# Patient Record
Sex: FEMALE | Race: WHITE | NOT HISPANIC OR LATINO
[De-identification: names, ages, dates, MRNs, and addresses within clinical notes are randomized per-mention and may not be internally consistent; named-entity substitution may affect disease eponyms.]

---

## 2024-04-09 ENCOUNTER — NON-APPOINTMENT (OUTPATIENT)
Age: 34
End: 2024-04-09

## 2024-04-10 ENCOUNTER — APPOINTMENT (OUTPATIENT)
Dept: ENDOCRINOLOGY | Facility: CLINIC | Age: 34
End: 2024-04-10
Payer: COMMERCIAL

## 2024-04-10 VITALS
HEIGHT: 67 IN | WEIGHT: 219 LBS | HEART RATE: 85 BPM | SYSTOLIC BLOOD PRESSURE: 139 MMHG | DIASTOLIC BLOOD PRESSURE: 90 MMHG | BODY MASS INDEX: 34.37 KG/M2

## 2024-04-10 DIAGNOSIS — Z83.49 FAMILY HISTORY OF OTHER ENDOCRINE, NUTRITIONAL AND METABOLIC DISEASES: ICD-10-CM

## 2024-04-10 DIAGNOSIS — Z87.09 PERSONAL HISTORY OF OTHER DISEASES OF THE RESPIRATORY SYSTEM: ICD-10-CM

## 2024-04-10 DIAGNOSIS — Z82.49 FAMILY HISTORY OF ISCHEMIC HEART DISEASE AND OTHER DISEASES OF THE CIRCULATORY SYSTEM: ICD-10-CM

## 2024-04-10 DIAGNOSIS — Z80.0 FAMILY HISTORY OF MALIGNANT NEOPLASM OF DIGESTIVE ORGANS: ICD-10-CM

## 2024-04-10 DIAGNOSIS — Z86.018 PERSONAL HISTORY OF OTHER BENIGN NEOPLASM: ICD-10-CM

## 2024-04-10 PROBLEM — Z00.00 ENCOUNTER FOR PREVENTIVE HEALTH EXAMINATION: Status: ACTIVE | Noted: 2024-04-10

## 2024-04-10 PROCEDURE — 99204 OFFICE O/P NEW MOD 45 MIN: CPT | Mod: GC

## 2024-04-10 PROCEDURE — 36415 COLL VENOUS BLD VENIPUNCTURE: CPT

## 2024-04-10 RX ORDER — CHLORHEXIDINE GLUCONATE 4 %
325 (65 FE) LIQUID (ML) TOPICAL
Refills: 0 | Status: ACTIVE | COMMUNITY

## 2024-04-10 NOTE — ADDENDUM
[FreeTextEntry1] : Attending endocrinologist. The patient was seen with Dr. Nguyen. She is a 33 y.o who was found to have TSH of 4.97, with fT4 of 1.9. She has no symptoms suggestive of hypothyroidism. Her thyroid exam today is normal. Will obtain TFTs and anti-thyroid ABs. Will obtain thyroid u/sound. Will follow once the above results are available. Agree with Dr. Nguyen's assessment and plan.  This visit's time included review of previous records,  lab. data, discussing findings, differential diagnosis, further testing and evaluation, therapeutic options, renewing medications, completing the record.

## 2024-04-10 NOTE — ASSESSMENT
[FreeTextEntry1] : # Subclinical hypothyroidism  - labs showed TSH 4.970, Free T4 1.9 - given strong family history, likely have autoimmune process - will check antibodies - explained to patient that with her age if she's + for antibodies, will consider treatment - will repeat TFTs  - will obtain thyroid ultrasound  d/w Dr. Still

## 2024-04-10 NOTE — HISTORY OF PRESENT ILLNESS
[FreeTextEntry1] : 32 yo F with asthma referred here by her gynecologist for abnormal TFTs  Pt has been seeing gynecologist for her recurrent fibroids.  Recently had MRI which revealed markely enlarged fibroid uterus with largest fibroid  From the pre-operative lab testing, 02/2024 labs showed TSH 4.970, Free T41.9, total t4 7.4 LH 6.2  FSH 3.8 prolactin 18.5 total testosterone 25 Pt states her periods are regular, heavy and painful. No kids.  Works from home Mom and grandmother have hypothyroidism weighs 221 lbs, gaining weight taking iron frequently constipatied.     denies neck radiation, neck surgery, exposure to amiodarone and lithium

## 2024-04-12 ENCOUNTER — TRANSCRIPTION ENCOUNTER (OUTPATIENT)
Age: 34
End: 2024-04-12

## 2024-04-12 LAB
T4 FREE SERPL-MCNC: 1.1 NG/DL
THYROPEROXIDASE AB SERPL IA-ACNC: 443 IU/ML
TSH SERPL-ACNC: 4.74 UIU/ML

## 2024-04-15 LAB
THYROGLOB AB SERPL-ACNC: <1 IU/ML
THYROGLOB SERPL-MCNC: 19.4 NG/ML
THYROGLOB SERPL-MCNC: NORMAL NG/ML

## 2024-05-11 ENCOUNTER — APPOINTMENT (OUTPATIENT)
Dept: ULTRASOUND IMAGING | Facility: HOSPITAL | Age: 34
End: 2024-05-11
Payer: COMMERCIAL

## 2024-05-11 ENCOUNTER — OUTPATIENT (OUTPATIENT)
Dept: OUTPATIENT SERVICES | Facility: HOSPITAL | Age: 34
LOS: 1 days | End: 2024-05-11

## 2024-05-11 PROCEDURE — 76536 US EXAM OF HEAD AND NECK: CPT | Mod: 26

## 2024-05-11 PROCEDURE — 76536 US EXAM OF HEAD AND NECK: CPT

## 2024-05-12 ENCOUNTER — NON-APPOINTMENT (OUTPATIENT)
Age: 34
End: 2024-05-12

## 2024-05-15 ENCOUNTER — APPOINTMENT (OUTPATIENT)
Dept: ENDOCRINOLOGY | Facility: CLINIC | Age: 34
End: 2024-05-15
Payer: COMMERCIAL

## 2024-05-15 VITALS
WEIGHT: 222 LBS | BODY MASS INDEX: 34.77 KG/M2 | DIASTOLIC BLOOD PRESSURE: 86 MMHG | SYSTOLIC BLOOD PRESSURE: 126 MMHG | HEART RATE: 83 BPM

## 2024-05-15 DIAGNOSIS — E03.8 OTHER SPECIFIED HYPOTHYROIDISM: ICD-10-CM

## 2024-05-15 DIAGNOSIS — E06.3 AUTOIMMUNE THYROIDITIS: ICD-10-CM

## 2024-05-15 PROCEDURE — 99214 OFFICE O/P EST MOD 30 MIN: CPT | Mod: GC

## 2024-05-15 RX ORDER — LEVOTHYROXINE SODIUM 0.05 MG/1
50 TABLET ORAL DAILY
Qty: 30 | Refills: 1 | Status: ACTIVE | COMMUNITY
Start: 2024-04-12 | End: 1900-01-01

## 2024-05-15 NOTE — END OF VISIT
[] : Fellow [FreeTextEntry3] : History of subclinical hypothyroidism.  Now been on LT4 50 mcg for 2 weeks, will repeat TFTs in 4 weeks  Clinically Euthyroid   Case discussed with Dr. Nguyen

## 2024-05-15 NOTE — PHYSICAL EXAM
[Alert] : alert [Well Nourished] : well nourished [No Acute Distress] : no acute distress [Well Developed] : well developed [Normal Sclera/Conjunctiva] : normal sclera/conjunctiva [No Proptosis] : no proptosis [Thyroid Not Enlarged] : the thyroid was not enlarged [No Thyroid Nodules] : no palpable thyroid nodules [No Respiratory Distress] : no respiratory distress [No Accessory Muscle Use] : no accessory muscle use [Clear to Auscultation] : lungs were clear to auscultation bilaterally [Normal S1, S2] : normal S1 and S2 [Normal Rate] : heart rate was normal [Regular Rhythm] : with a regular rhythm [Normal Bowel Sounds] : normal bowel sounds [Not Tender] : non-tender [Not Distended] : not distended [Soft] : abdomen soft [Normal Anterior Cervical Nodes] : no anterior cervical lymphadenopathy [No Spinal Tenderness] : no spinal tenderness [Spine Straight] : spine straight [Normal Gait] : normal gait [Normal Reflexes] : deep tendon reflexes were 2+ and symmetric [No Tremors] : no tremors [Oriented x3] : oriented to person, place, and time [EOMI] : extra ocular movement intact

## 2024-05-15 NOTE — HISTORY OF PRESENT ILLNESS
Call received from Nakia Reyes, daughter, stating she has concerns regarding her dad. Nakia Reyes stated he is due for tx next week and stated he is having issues with increased fatigue, weakness, weight loss, dehydration and stated he fell a few days ago. Nakia Reyes stated that she gave her dad a few glasses of juice and he ate a small snack and perked right up. Nakia Reyes stated she wants patient to be seen by Dr Radha Chang. Writer reviewed schedule and Dr Meme Serna is rounding next week and not in the office until 1/22. Writer called [employer] and spoke with Stephanie Olivarez. Stephanie Olivarez stated she has an opening at 11:30am tomorrow at [facility] with Dr Meme Serna. Nakia Reyes informed of appt time for 11:30am tomorrow at [facility] and she stated that would work. Appt made per Stephanie Olivarez. Writer instructed Nakia Reyes to have patient go to ER STAT with any severe confusion, worsening dizziness, if he falls again, with any SOB or any other severe s/s. Nakia Reyes verbalized understanding.  Jacinta Gibson [FreeTextEntry1] : 34 yo F with asthma here for f/u visit  05/15/2024 - f/u visit TPO ab + 400 started on levothyroxine 25 mcg and increased to levothyroxine 50 mcg a week after, TSH 4.74, Free T4 1.1 surgery for fibroids in october takes iron in the afternoon diffusely heterogenous thyroid gland - 2/2 thyroidtiis on TUS - recent one no plan for pregnancy per patinet she endorses her energy seems to be better on the thyroid medication continues to have painful period  04/10/2024 - initial visit Pt has been seeing gynecologist for her recurrent fibroids.  Recently had MRI which revealed markely enlarged fibroid uterus with largest fibroid From the pre-operative lab testing, 02/2024 labs showed TSH 4.970, Free T41.9, total t4 7.4 LH 6.2  FSH 3.8 prolactin 18.5 total testosterone 25 Pt states her periods are regular, heavy and painful. No kids.  Works from home Mom and grandmother have hypothyroidism weighs 221 lbs, gaining weight taking iron frequently constipatied.     denies neck radiation, neck surgery, exposure to amiodarone and lithium

## 2024-05-15 NOTE — ASSESSMENT
[FreeTextEntry1] : # Hashimoto's thyroditis # Subclinical hypothyroidism - c/w levothyroxine 50 mcg daily - counselled pt to let the clinic know if she is changing her plan to become pregnant, counselled on the severed effects of hypothyroidism on an infant - repeat TFTs in 4 weeks  - d/w Dr. Baird

## 2024-10-14 VITALS
HEART RATE: 77 BPM | DIASTOLIC BLOOD PRESSURE: 75 MMHG | OXYGEN SATURATION: 97 % | TEMPERATURE: 98 F | SYSTOLIC BLOOD PRESSURE: 116 MMHG | HEIGHT: 67 IN | RESPIRATION RATE: 16 BRPM | WEIGHT: 218.26 LBS

## 2024-10-14 NOTE — ASU PATIENT PROFILE, ADULT - FALL HARM RISK - UNIVERSAL INTERVENTIONS
Bed in lowest position, wheels locked, appropriate side rails in place/Call bell, personal items and telephone in reach/Instruct patient to call for assistance before getting out of bed or chair/Non-slip footwear when patient is out of bed/[place] to call system/Physically safe environment - no spills, clutter or unnecessary equipment/Purposeful Proactive Rounding/Room/bathroom lighting operational, light cord in reach

## 2024-10-14 NOTE — PRE-OP CHECKLIST - SPO2 (%)
Quality 47: Advance Care Plan: Advance Care Planning discussed and documented; advance care plan or surrogate decision maker documented in the medical record. Quality 130: Documentation Of Current Medications In The Medical Record: Current Medications Documented Name And Contact Information For Health Care Proxy: Daniela Maggy [phone] Additional Notes: They don’t do well with flu or pneumonia vaccine Quality 111:Pneumonia Vaccination Status For Older Adults: Pneumococcal Vaccination not Administered or Previously Received, Reason not Otherwise Specified Detail Level: Detailed Quality 431: Preventive Care And Screening: Unhealthy Alcohol Use - Screening: Patient screened for unhealthy alcohol use using a single question and scores less than 2 times per year Quality 226: Preventive Care And Screening: Tobacco Use: Screening And Cessation Intervention: Patient screened for tobacco use and is an ex/non-smoker Quality 110: Preventive Care And Screening: Influenza Immunization: Influenza Immunization not Administered for Documented Reasons. 97

## 2024-10-14 NOTE — ASU PATIENT PROFILE, ADULT - NSICDXPASTMEDICALHX_GEN_ALL_CORE_FT
PAST MEDICAL HISTORY:  Leiomyoma      PAST MEDICAL HISTORY:  Asthma     Leiomyoma      PAST MEDICAL HISTORY:  Asthma     Hashimoto's disease     Leiomyoma

## 2024-10-15 ENCOUNTER — TRANSCRIPTION ENCOUNTER (OUTPATIENT)
Age: 34
End: 2024-10-15

## 2024-10-15 ENCOUNTER — OUTPATIENT (OUTPATIENT)
Dept: INPATIENT UNIT | Facility: HOSPITAL | Age: 34
LOS: 1 days | Discharge: HOME | End: 2024-10-15
Payer: COMMERCIAL

## 2024-10-15 DIAGNOSIS — Z98.890 OTHER SPECIFIED POSTPROCEDURAL STATES: Chronic | ICD-10-CM

## 2024-10-15 LAB
BLD GP AB SCN SERPL QL: NEGATIVE — SIGNIFICANT CHANGE UP
HCT VFR BLD CALC: 34.5 % — SIGNIFICANT CHANGE UP (ref 34.5–45)
HGB BLD-MCNC: 11 G/DL — LOW (ref 11.5–15.5)
MCHC RBC-ENTMCNC: 26.4 PG — LOW (ref 27–34)
MCHC RBC-ENTMCNC: 31.9 GM/DL — LOW (ref 32–36)
MCV RBC AUTO: 82.9 FL — SIGNIFICANT CHANGE UP (ref 80–100)
NRBC # BLD: 0 /100 WBCS — SIGNIFICANT CHANGE UP (ref 0–0)
PLATELET # BLD AUTO: 346 K/UL — SIGNIFICANT CHANGE UP (ref 150–400)
RBC # BLD: 4.16 M/UL — SIGNIFICANT CHANGE UP (ref 3.8–5.2)
RBC # FLD: 14.7 % — HIGH (ref 10.3–14.5)
RH IG SCN BLD-IMP: POSITIVE — SIGNIFICANT CHANGE UP
T4 FREE SERPL-MCNC: 1.1 NG/DL
TSH SERPL-ACNC: 4.38 UIU/ML
WBC # BLD: 12.06 K/UL — HIGH (ref 3.8–10.5)
WBC # FLD AUTO: 12.06 K/UL — HIGH (ref 3.8–10.5)

## 2024-10-15 PROCEDURE — 88305 TISSUE EXAM BY PATHOLOGIST: CPT | Mod: 26

## 2024-10-15 DEVICE — INTERCEED 3 X 4": Type: IMPLANTABLE DEVICE | Status: FUNCTIONAL

## 2024-10-15 DEVICE — SURGICEL POWDER 3 GRAMS: Type: IMPLANTABLE DEVICE | Status: FUNCTIONAL

## 2024-10-15 RX ORDER — ACETAMINOPHEN 325 MG
1000 TABLET ORAL ONCE
Refills: 0 | Status: COMPLETED | OUTPATIENT
Start: 2024-10-15 | End: 2024-10-15

## 2024-10-15 RX ORDER — ACETAMINOPHEN 325 MG
2 TABLET ORAL
Qty: 0 | Refills: 0 | DISCHARGE
Start: 2024-10-15

## 2024-10-15 RX ORDER — SODIUM CHLORIDE IRRIG SOLUTION 0.9 %
1000 SOLUTION, IRRIGATION IRRIGATION
Refills: 0 | Status: DISCONTINUED | OUTPATIENT
Start: 2024-10-15 | End: 2024-10-16

## 2024-10-15 RX ORDER — OXYCODONE HYDROCHLORIDE 30 MG/1
10 TABLET, FILM COATED, EXTENDED RELEASE ORAL EVERY 4 HOURS
Refills: 0 | Status: DISCONTINUED | OUTPATIENT
Start: 2024-10-15 | End: 2024-10-16

## 2024-10-15 RX ORDER — METOCLOPRAMIDE HCL 5 MG
10 TABLET ORAL EVERY 6 HOURS
Refills: 0 | Status: DISCONTINUED | OUTPATIENT
Start: 2024-10-15 | End: 2024-10-16

## 2024-10-15 RX ORDER — KETOROLAC TROMETHAMINE 10 MG/1
30 TABLET, FILM COATED ORAL EVERY 8 HOURS
Refills: 0 | Status: DISCONTINUED | OUTPATIENT
Start: 2024-10-15 | End: 2024-10-16

## 2024-10-15 RX ORDER — OXYCODONE HYDROCHLORIDE 30 MG/1
5 TABLET, FILM COATED, EXTENDED RELEASE ORAL EVERY 4 HOURS
Refills: 0 | Status: DISCONTINUED | OUTPATIENT
Start: 2024-10-15 | End: 2024-10-16

## 2024-10-15 RX ORDER — ALBUTEROL 90 MCG
2 AEROSOL (GRAM) INHALATION
Refills: 0 | DISCHARGE

## 2024-10-15 RX ORDER — 5-HYDROXYTRYPTOPHAN (5-HTP) 100 MG
5 TABLET,DISINTEGRATING ORAL AT BEDTIME
Refills: 0 | Status: DISCONTINUED | OUTPATIENT
Start: 2024-10-15 | End: 2024-10-16

## 2024-10-15 RX ORDER — ALBUTEROL 90 MCG
2 AEROSOL (GRAM) INHALATION EVERY 6 HOURS
Refills: 0 | Status: DISCONTINUED | OUTPATIENT
Start: 2024-10-15 | End: 2024-10-16

## 2024-10-15 RX ORDER — ONDANSETRON HCL/PF 4 MG/2 ML
8 VIAL (ML) INJECTION EVERY 6 HOURS
Refills: 0 | Status: DISCONTINUED | OUTPATIENT
Start: 2024-10-15 | End: 2024-10-16

## 2024-10-15 RX ORDER — ACETAMINOPHEN 325 MG
1000 TABLET ORAL EVERY 6 HOURS
Refills: 0 | Status: DISCONTINUED | OUTPATIENT
Start: 2024-10-15 | End: 2024-10-16

## 2024-10-15 RX ORDER — PANTOPRAZOLE SODIUM 40 MG/1
20 TABLET, DELAYED RELEASE ORAL DAILY
Refills: 0 | Status: DISCONTINUED | OUTPATIENT
Start: 2024-10-15 | End: 2024-10-16

## 2024-10-15 RX ORDER — HYDROMORPHONE HYDROCHLORIDE 1 MG/ML
0.5 INJECTION, SOLUTION INTRAMUSCULAR; INTRAVENOUS; SUBCUTANEOUS
Refills: 0 | Status: DISCONTINUED | OUTPATIENT
Start: 2024-10-15 | End: 2024-10-15

## 2024-10-15 RX ORDER — CELECOXIB 200 MG/1
400 CAPSULE ORAL ONCE
Refills: 0 | Status: COMPLETED | OUTPATIENT
Start: 2024-10-15 | End: 2024-10-15

## 2024-10-15 RX ADMIN — Medication 8 MILLIGRAM(S): at 18:23

## 2024-10-15 RX ADMIN — CELECOXIB 400 MILLIGRAM(S): 200 CAPSULE ORAL at 13:10

## 2024-10-15 RX ADMIN — Medication 5000 UNIT(S): at 13:10

## 2024-10-15 RX ADMIN — Medication 1000 MILLIGRAM(S): at 13:10

## 2024-10-15 RX ADMIN — KETOROLAC TROMETHAMINE 30 MILLIGRAM(S): 10 TABLET, FILM COATED ORAL at 21:49

## 2024-10-15 RX ADMIN — HYDROMORPHONE HYDROCHLORIDE 0.5 MILLIGRAM(S): 1 INJECTION, SOLUTION INTRAMUSCULAR; INTRAVENOUS; SUBCUTANEOUS at 18:50

## 2024-10-15 RX ADMIN — PANTOPRAZOLE SODIUM 20 MILLIGRAM(S): 40 TABLET, DELAYED RELEASE ORAL at 19:42

## 2024-10-15 RX ADMIN — Medication 80 MILLIGRAM(S): at 19:24

## 2024-10-15 RX ADMIN — Medication 125 MILLILITER(S): at 18:40

## 2024-10-15 RX ADMIN — Medication 1000 MILLIGRAM(S): at 19:24

## 2024-10-15 RX ADMIN — HYDROMORPHONE HYDROCHLORIDE 0.5 MILLIGRAM(S): 1 INJECTION, SOLUTION INTRAMUSCULAR; INTRAVENOUS; SUBCUTANEOUS at 18:23

## 2024-10-15 RX ADMIN — Medication 5 MILLIGRAM(S): at 21:49

## 2024-10-15 NOTE — PRE-ANESTHESIA EVALUATION ADULT - NSANTHOSAYNRD_GEN_A_CORE
No. ALEIDA screening performed.  STOP BANG Legend: 0-2 = LOW Risk; 3-4 = INTERMEDIATE Risk; 5-8 = HIGH Risk

## 2024-10-15 NOTE — DISCHARGE NOTE PROVIDER - NSDCMRMEDTOKEN_GEN_ALL_CORE_FT
acetaminophen 500 mg oral tablet: 2 tab(s) orally every 6 hours  Albuterol (Eqv-ProAir HFA) 90 mcg/inh inhalation aerosol: 2 puff(s) inhaled prn  ferrous sulfate 325 mg po daily:   levothyroxine 50 mcg (0.05 mg) oral tablet: 1 tab(s) orally once a day

## 2024-10-15 NOTE — BRIEF OPERATIVE NOTE - NSICDXBRIEFPROCEDURE_GEN_ALL_CORE_FT
PROCEDURES:  Abdominal myomectomy 15-Oct-2024 17:35:08  Isac Bustos  Lysis of intestinal adhesions 15-Oct-2024 17:35:32 and peritoneal Isac Bustos

## 2024-10-15 NOTE — DISCHARGE NOTE PROVIDER - HOSPITAL COURSE
35 yo w/ PMH anemia, asthma, and hashimoto thyroiditis presented for scheduled surgical management of symptomatic fibroids, s/p abdominal myomectomy, lysis of adhesions 10/15. Patient met postoperative milestones appropriately and at discharge is hemodynamically stable.

## 2024-10-15 NOTE — H&P ADULT - HISTORY OF PRESENT ILLNESS
POD0     33yo G0 LMP 9/29 presenting for abdominal myomectomy for heavy menses    Hb 11.9, Cr 0.65    OB: G0   Gyn: denies hx abnormal pap smears, stds. +fibroids  PMH: asthma, hashimoto, anemia  PSH: abdominal myomectomy 2018  Meds: levothyroxine 50mcg, iron, albuterol prn   ALL: PCN (rash)

## 2024-10-15 NOTE — DISCHARGE NOTE PROVIDER - NSDCFUSCHEDAPPT_GEN_ALL_CORE_FT
Michelle Nguyen Physician [facility]  END[address]CR[address]  Scheduled Appointment: 11/01/2024    Michelle Nguyen  [facility]  ENDOCRIN [address]  Scheduled Appointment: 01/08/2025

## 2024-10-15 NOTE — DISCHARGE NOTE PROVIDER - NSDCFUADDINST_GEN_ALL_CORE_FT
- Nothing in vagina: no intercourse, tampons, or douching until cleared by your doctor.   - Avoid swimming, tub baths, and heavy lifting until cleared by your doctor.   - Showering is acceptable.   - Continue oral pain medications as directed and as needed for pain.    - Follow up in office in 1-2 weeks for your postoperative visit.    - Call the office sooner if you develop any fever, heavy bleeding, or severe pain.  - Go to the closest emergency room for any of these symptoms if you are not able to contact your doctor.

## 2024-10-15 NOTE — DISCHARGE NOTE PROVIDER - CARE PROVIDER_API CALL
Vania Breaux  Obstetrics and Gynecology  [address], [address]  [place] [address]  Phone: [phone]  Fax: [phone]  Follow Up Time:

## 2024-10-15 NOTE — DISCHARGE NOTE PROVIDER - CARE PROVIDERS DIRECT ADDRESSES
[email or web address]

## 2024-10-15 NOTE — DISCHARGE NOTE PROVIDER - NSDCCPTREATMENT_GEN_ALL_CORE_FT
PRINCIPAL PROCEDURE  Procedure: Abdominal myomectomy  Findings and Treatment:       SECONDARY PROCEDURE  Procedure: Lysis of intestinal adhesions  Findings and Treatment: and peritoneal

## 2024-10-15 NOTE — BRIEF OPERATIVE NOTE - OPERATION/FINDINGS
EUA revealed normal external female genitalia. Uterus noted to be about 8cm above umbilicus, deviated to right side.   Pfannenstiel skin incision through previous incision.   Difficulty externalizing uterus initially 2/2 to large fibroids and significant bowel adhesions to posterior uterus. Initial myomectomy completed for lower uterine segment, then able to externalize uterus. Dominant 18cm pedunculated fibroid with 5cm stalk noted and removed with assistance of tourniquet. Vasopressin injected to all fibroids. Closed with stratfix. Mattress sutures with 1 PDS on XLH used for additional hemostasis. Intercede and surgicel powder applied to incisional sites.   Fascia closed with 0 vicryl. Subcutaneous closed with monocryl. Skin closed with insorb.   1u PRBC and TXA administered intraop.   , excellent hemostasis.

## 2024-10-15 NOTE — CHART NOTE - NSCHARTNOTEFT_GEN_A_CORE
Patient seen at bedside. She reports mild numbness in the right hand that has been present since surgery. She denies headache, weakness, or difficulty speaking. Overall, pain is controlled with meds but she reports moderate abdominal pain. She is tolerating small sips of water but has not tried food yet. Not yet passing flatus. Alves catheter in place.    T(C): 36.8 (10-15-24 @ 20:01), Max: 37.2 (10-15-24 @ 17:44)  HR: 106 (10-15-24 @ 20:01) (77 - 106)  BP: 127/83 (10-15-24 @ 20:01) (116/75 - 141/76)  RR: 18 (10-15-24 @ 20:01) (12 - 18)  SpO2: 94% (10-15-24 @ 20:01) (94% - 98%)  General: no apparent distress  Pulmonary: no increased work of breathing  Abdomen: island dressing clean dry intact; nondistended; normoactive bowel sounds; soft; appropriately tender  Neuro:  strength equal bilaterally; gross motion intact; speech appropriate  Lower extremities: no calf tenderness or swelling bilaterally        34y s/p abdominal myomectomy, stable and meeting postoperative milestones.    Neuro: tylenol/toradol atc, oxy prn   Pulm: RA, asthma - albuterol PRN   Endo: hypothyroidism - synthroid 50mcg QD   CV: VSS,   GI: LFD, zofran/reglan prn, protonix qd, simethicone atc  : alves  GYN: s/p abdominal myomectomy  DVT ppx: SCDs

## 2024-10-16 ENCOUNTER — TRANSCRIPTION ENCOUNTER (OUTPATIENT)
Age: 34
End: 2024-10-16

## 2024-10-16 VITALS
TEMPERATURE: 98 F | HEART RATE: 96 BPM | RESPIRATION RATE: 18 BRPM | DIASTOLIC BLOOD PRESSURE: 76 MMHG | OXYGEN SATURATION: 96 % | SYSTOLIC BLOOD PRESSURE: 122 MMHG

## 2024-10-16 LAB
BASOPHILS # BLD AUTO: 0.01 K/UL — SIGNIFICANT CHANGE UP (ref 0–0.2)
BASOPHILS NFR BLD AUTO: 0.1 % — SIGNIFICANT CHANGE UP (ref 0–2)
EOSINOPHIL # BLD AUTO: 0 K/UL — SIGNIFICANT CHANGE UP (ref 0–0.5)
EOSINOPHIL NFR BLD AUTO: 0 % — SIGNIFICANT CHANGE UP (ref 0–6)
HCT VFR BLD CALC: 32.8 % — LOW (ref 34.5–45)
HGB BLD-MCNC: 10.1 G/DL — LOW (ref 11.5–15.5)
IMM GRANULOCYTES NFR BLD AUTO: 0.1 % — SIGNIFICANT CHANGE UP (ref 0–0.9)
LYMPHOCYTES # BLD AUTO: 1.11 K/UL — SIGNIFICANT CHANGE UP (ref 1–3.3)
LYMPHOCYTES # BLD AUTO: 14.6 % — SIGNIFICANT CHANGE UP (ref 13–44)
MCHC RBC-ENTMCNC: 26.1 PG — LOW (ref 27–34)
MCHC RBC-ENTMCNC: 30.8 GM/DL — LOW (ref 32–36)
MCV RBC AUTO: 84.8 FL — SIGNIFICANT CHANGE UP (ref 80–100)
MONOCYTES # BLD AUTO: 0.99 K/UL — HIGH (ref 0–0.9)
MONOCYTES NFR BLD AUTO: 13 % — SIGNIFICANT CHANGE UP (ref 2–14)
NEUTROPHILS # BLD AUTO: 5.47 K/UL — SIGNIFICANT CHANGE UP (ref 1.8–7.4)
NEUTROPHILS NFR BLD AUTO: 72.2 % — SIGNIFICANT CHANGE UP (ref 43–77)
NRBC # BLD: 0 /100 WBCS — SIGNIFICANT CHANGE UP (ref 0–0)
PLATELET # BLD AUTO: 335 K/UL — SIGNIFICANT CHANGE UP (ref 150–400)
RBC # BLD: 3.87 M/UL — SIGNIFICANT CHANGE UP (ref 3.8–5.2)
RBC # FLD: 15 % — HIGH (ref 10.3–14.5)
WBC # BLD: 7.59 K/UL — SIGNIFICANT CHANGE UP (ref 3.8–10.5)
WBC # FLD AUTO: 7.59 K/UL — SIGNIFICANT CHANGE UP (ref 3.8–10.5)

## 2024-10-16 PROCEDURE — 36415 COLL VENOUS BLD VENIPUNCTURE: CPT

## 2024-10-16 PROCEDURE — P9016: CPT

## 2024-10-16 PROCEDURE — 88305 TISSUE EXAM BY PATHOLOGIST: CPT

## 2024-10-16 PROCEDURE — 85025 COMPLETE CBC W/AUTO DIFF WBC: CPT

## 2024-10-16 PROCEDURE — C1889: CPT

## 2024-10-16 PROCEDURE — 36430 TRANSFUSION BLD/BLD COMPNT: CPT

## 2024-10-16 PROCEDURE — 58546 LAPARO-MYOMECTOMY COMPLEX: CPT

## 2024-10-16 PROCEDURE — 86923 COMPATIBILITY TEST ELECTRIC: CPT

## 2024-10-16 PROCEDURE — 85027 COMPLETE CBC AUTOMATED: CPT

## 2024-10-16 PROCEDURE — 86900 BLOOD TYPING SEROLOGIC ABO: CPT

## 2024-10-16 PROCEDURE — C1765: CPT

## 2024-10-16 PROCEDURE — 86850 RBC ANTIBODY SCREEN: CPT

## 2024-10-16 PROCEDURE — 86901 BLOOD TYPING SEROLOGIC RH(D): CPT

## 2024-10-16 RX ADMIN — Medication 1000 MILLIGRAM(S): at 12:45

## 2024-10-16 RX ADMIN — PANTOPRAZOLE SODIUM 20 MILLIGRAM(S): 40 TABLET, DELAYED RELEASE ORAL at 12:39

## 2024-10-16 RX ADMIN — KETOROLAC TROMETHAMINE 30 MILLIGRAM(S): 10 TABLET, FILM COATED ORAL at 15:37

## 2024-10-16 RX ADMIN — Medication 1000 MILLIGRAM(S): at 12:39

## 2024-10-16 RX ADMIN — Medication 80 MILLIGRAM(S): at 05:23

## 2024-10-16 RX ADMIN — Medication 50 MICROGRAM(S): at 05:23

## 2024-10-16 RX ADMIN — KETOROLAC TROMETHAMINE 30 MILLIGRAM(S): 10 TABLET, FILM COATED ORAL at 14:55

## 2024-10-16 RX ADMIN — Medication 80 MILLIGRAM(S): at 12:39

## 2024-10-16 RX ADMIN — KETOROLAC TROMETHAMINE 30 MILLIGRAM(S): 10 TABLET, FILM COATED ORAL at 05:23

## 2024-10-16 RX ADMIN — Medication 1000 MILLIGRAM(S): at 05:23

## 2024-10-16 NOTE — PROGRESS NOTE ADULT - ASSESSMENT
35yo G0 LMP 9/29 s/p abdominal myomectomy and YOANDY - 19 fibroids removed, largest 18cm, cavity not entered. Bowel adhesions to posterior uterus.     Neuro: tylenol/toradol ATC, oxy PRN. melatonin QHS.  Pulm: RA, Asthma - albuterol PRN   Endo: Hypothyroidism - synthroid QD   CV: VSS  GI: LFD, heplock, zofran/reglan PRN, protonix QD, simethicone ATC  : s/p alves  GYN: s/p abdominal myomectomy  Heme: Hgb 11.9>11.0  DVT: SCDs    F/u AM labs and TOV  Encourage ISS and ambulation   Possible dc this afternoon

## 2024-10-16 NOTE — DISCHARGE NOTE NURSING/CASE MANAGEMENT/SOCIAL WORK - FINANCIAL ASSISTANCE
[facility] provides services at a reduced cost to those who are determined to be eligible through [facility]’s financial assistance program. Information regarding [facility]’s financial assistance program can be found by going to [email or web address] or by calling [phone].

## 2024-10-16 NOTE — PROGRESS NOTE ADULT - SUBJECTIVE AND OBJECTIVE BOX
Pt seen and examined at bedside. Pt states mild abdominal pain. Pt not yet ambulating, tolerating clear diet. Not yet passing flatus, urinating adequately in alves.   Pt denies fever, chills, chest pain, SOB, nausea, vomiting, lightheadedness, dizziness.      T(F): 98.6 (10-16-24 @ 05:25), Max: 98.9 (10-15-24 @ 17:44)  HR: 86 (10-16-24 @ 05:25) (77 - 106)  BP: 121/79 (10-16-24 @ 05:25) (116/75 - 141/76)  RR: 18 (10-16-24 @ 05:25) (12 - 18)  SpO2: 95% (10-16-24 @ 05:25) (94% - 98%)  I&O's Summary    15 Oct 2024 07:01  -  16 Oct 2024 07:00  --------------------------------------------------------  IN: 1750 mL / OUT: 2350 mL / NET: -600 mL        MEDICATIONS  (STANDING):  acetaminophen     Tablet .. 1000 milliGRAM(s) Oral every 6 hours  ketorolac   Injectable 30 milliGRAM(s) IV Push every 8 hours  levothyroxine 50 MICROGram(s) Oral every 24 hours  melatonin 5 milliGRAM(s) Oral at bedtime  pantoprazole  Injectable 20 milliGRAM(s) IV Push daily  simethicone 80 milliGRAM(s) Chew every 6 hours    MEDICATIONS  (PRN):  albuterol    90 MICROgram(s) HFA Inhaler 2 Puff(s) Inhalation every 6 hours PRN Bronchospasm  metoclopramide Injectable 10 milliGRAM(s) IV Push every 6 hours PRN Nausea and/or Vomiting  ondansetron Injectable 8 milliGRAM(s) IV Push every 6 hours PRN Nausea and/or Vomiting  oxyCODONE    IR 5 milliGRAM(s) Oral every 4 hours PRN Moderate Pain (4 - 6)  oxyCODONE    IR 10 milliGRAM(s) Oral every 4 hours PRN Severe Pain (7 - 10)      Physical Exam:  Constitutional: NAD  Pulmonary: no increased work of breathing  Cardiovascular: Regular rate and rhythm   Abdomen: incision site clean, dry, intact. Island dressing in place. Soft, mildly tender, mildly distended, no guarding, no rebound, + bowel sounds  Extremities: no lower extremity edema or calf tenderness. SCDs in place     LABS:                        11.0   12.06 )-----------( 346      ( 15 Oct 2024 20:58 )             34.5

## 2024-10-16 NOTE — DISCHARGE NOTE NURSING/CASE MANAGEMENT/SOCIAL WORK - PATIENT PORTAL LINK FT
You can access the FollowMyHealth Patient Portal offered by [facility] by registering at the following website: [email or web address]. By joining [employer]’s FollowMyHealth portal, you will also be able to view your health information using other applications (apps) compatible with our system.

## 2024-10-28 LAB — SURGICAL PATHOLOGY STUDY: SIGNIFICANT CHANGE UP

## 2024-10-29 DIAGNOSIS — J45.909 UNSPECIFIED ASTHMA, UNCOMPLICATED: ICD-10-CM

## 2024-10-29 DIAGNOSIS — D25.9 LEIOMYOMA OF UTERUS, UNSPECIFIED: ICD-10-CM

## 2024-10-29 DIAGNOSIS — N92.0 EXCESSIVE AND FREQUENT MENSTRUATION WITH REGULAR CYCLE: ICD-10-CM

## 2024-10-29 DIAGNOSIS — Z88.0 ALLERGY STATUS TO PENICILLIN: ICD-10-CM

## 2024-10-29 DIAGNOSIS — E06.3 AUTOIMMUNE THYROIDITIS: ICD-10-CM

## 2024-10-29 DIAGNOSIS — Z79.890 HORMONE REPLACEMENT THERAPY: ICD-10-CM

## 2024-11-01 ENCOUNTER — APPOINTMENT (OUTPATIENT)
Dept: ENDOCRINOLOGY | Facility: CLINIC | Age: 34
End: 2024-11-01

## 2025-03-28 ENCOUNTER — APPOINTMENT (OUTPATIENT)
Dept: ENDOCRINOLOGY | Facility: CLINIC | Age: 35
End: 2025-03-28
Payer: COMMERCIAL

## 2025-03-28 VITALS
SYSTOLIC BLOOD PRESSURE: 115 MMHG | HEART RATE: 85 BPM | BODY MASS INDEX: 35.31 KG/M2 | WEIGHT: 225 LBS | DIASTOLIC BLOOD PRESSURE: 76 MMHG | HEIGHT: 67 IN

## 2025-03-28 DIAGNOSIS — E03.8 OTHER SPECIFIED HYPOTHYROIDISM: ICD-10-CM

## 2025-03-28 DIAGNOSIS — E66.01 MORBID (SEVERE) OBESITY DUE TO EXCESS CALORIES: ICD-10-CM

## 2025-03-28 DIAGNOSIS — E06.3 AUTOIMMUNE THYROIDITIS: ICD-10-CM

## 2025-03-28 LAB
T4 FREE SERPL-MCNC: 0.9 NG/DL
TSH SERPL-ACNC: 2.58 UIU/ML

## 2025-03-28 PROCEDURE — 99213 OFFICE O/P EST LOW 20 MIN: CPT | Mod: GC,25

## 2025-03-28 PROCEDURE — 36415 COLL VENOUS BLD VENIPUNCTURE: CPT

## 2025-03-28 RX ORDER — SEMAGLUTIDE 0.25 MG/.5ML
0.25 INJECTION, SOLUTION SUBCUTANEOUS
Refills: 0 | Status: ACTIVE | COMMUNITY

## 2025-04-01 LAB
CHOLEST SERPL-MCNC: 197 MG/DL
HDLC SERPL-MCNC: 49 MG/DL
LDLC SERPL-MCNC: 128 MG/DL
NONHDLC SERPL-MCNC: 148 MG/DL
TRIGL SERPL-MCNC: 111 MG/DL

## 2025-07-29 ENCOUNTER — NON-APPOINTMENT (OUTPATIENT)
Age: 35
End: 2025-07-29

## 2025-07-30 ENCOUNTER — APPOINTMENT (OUTPATIENT)
Dept: ENDOCRINOLOGY | Facility: CLINIC | Age: 35
End: 2025-07-30
Payer: COMMERCIAL

## 2025-07-30 VITALS
BODY MASS INDEX: 35.79 KG/M2 | HEIGHT: 67 IN | WEIGHT: 228 LBS | HEART RATE: 95 BPM | SYSTOLIC BLOOD PRESSURE: 128 MMHG | DIASTOLIC BLOOD PRESSURE: 81 MMHG

## 2025-07-30 DIAGNOSIS — E03.8 OTHER SPECIFIED HYPOTHYROIDISM: ICD-10-CM

## 2025-07-30 DIAGNOSIS — D64.9 ANEMIA, UNSPECIFIED: ICD-10-CM

## 2025-07-30 DIAGNOSIS — E06.3 AUTOIMMUNE THYROIDITIS: ICD-10-CM

## 2025-07-30 DIAGNOSIS — E78.5 HYPERLIPIDEMIA, UNSPECIFIED: ICD-10-CM

## 2025-07-30 DIAGNOSIS — E66.01 MORBID (SEVERE) OBESITY DUE TO EXCESS CALORIES: ICD-10-CM

## 2025-07-30 PROCEDURE — 99214 OFFICE O/P EST MOD 30 MIN: CPT | Mod: GC

## 2025-07-30 RX ORDER — METFORMIN HYDROCHLORIDE 500 MG/1
500 TABLET, COATED ORAL
Qty: 180 | Refills: 1 | Status: ACTIVE | COMMUNITY
Start: 2025-07-30 | End: 1900-01-01

## 2025-07-31 ENCOUNTER — NON-APPOINTMENT (OUTPATIENT)
Age: 35
End: 2025-07-31

## 2025-07-31 LAB
ALBUMIN SERPL ELPH-MCNC: 4.6 G/DL
ALP BLD-CCNC: 89 U/L
ALT SERPL-CCNC: 16 U/L
ANION GAP SERPL CALC-SCNC: 15 MMOL/L
AST SERPL-CCNC: 15 U/L
BASOPHILS # BLD AUTO: 0.06 K/UL
BASOPHILS NFR BLD AUTO: 0.8 %
BILIRUB SERPL-MCNC: 0.2 MG/DL
BUN SERPL-MCNC: 9 MG/DL
CALCIUM SERPL-MCNC: 9.4 MG/DL
CHLORIDE SERPL-SCNC: 100 MMOL/L
CO2 SERPL-SCNC: 21 MMOL/L
CREAT SERPL-MCNC: 0.65 MG/DL
EGFRCR SERPLBLD CKD-EPI 2021: 116 ML/MIN/1.73M2
EOSINOPHIL # BLD AUTO: 0.27 K/UL
EOSINOPHIL NFR BLD AUTO: 3.5 %
ESTIMATED AVERAGE GLUCOSE: 117 MG/DL
GLUCOSE SERPL-MCNC: 90 MG/DL
HBA1C MFR BLD HPLC: 5.7 %
HCT VFR BLD CALC: 40.1 %
HGB BLD-MCNC: 11.9 G/DL
IMM GRANULOCYTES NFR BLD AUTO: 0.4 %
LYMPHOCYTES # BLD AUTO: 1.82 K/UL
LYMPHOCYTES NFR BLD AUTO: 23.7 %
MAN DIFF?: NORMAL
MCHC RBC-ENTMCNC: 27.6 PG
MCHC RBC-ENTMCNC: 29.7 G/DL
MCV RBC AUTO: 93 FL
MONOCYTES # BLD AUTO: 0.38 K/UL
MONOCYTES NFR BLD AUTO: 4.9 %
NEUTROPHILS # BLD AUTO: 5.13 K/UL
NEUTROPHILS NFR BLD AUTO: 66.7 %
PLATELET # BLD AUTO: 370 K/UL
POTASSIUM SERPL-SCNC: 5.1 MMOL/L
PROT SERPL-MCNC: 7.6 G/DL
RBC # BLD: 4.31 M/UL
RBC # FLD: 13.5 %
SODIUM SERPL-SCNC: 135 MMOL/L
T4 FREE SERPL-MCNC: 1.2 NG/DL
THYROGLOB AB SERPL-ACNC: 19.9 IU/ML
THYROPEROXIDASE AB SERPL IA-ACNC: 159 IU/ML
TSH SERPL-ACNC: 2.31 UIU/ML
VIT B12 SERPL-MCNC: 248 PG/ML
WBC # FLD AUTO: 7.69 K/UL

## (undated) DEVICE — POSITIONER FOAM EGG CRATE ULNAR 2PCS (PINK)

## (undated) DEVICE — TROCAR COVIDIEN VERSAONE FIXATION CANNULA 12MM

## (undated) DEVICE — LUBRICANT INST ELECTROLUBE Z SOLUTION

## (undated) DEVICE — SUT PASSER SYMMETRY OR PRO PUNCTURE CLOSURE DEVICE

## (undated) DEVICE — FOLEY TRAY 16FR 5CC LF UMETER CLOSED

## (undated) DEVICE — SPONGE ENDO PEANUT 5MM

## (undated) DEVICE — NDL GRASPING DISP

## (undated) DEVICE — SUT VICRYL 0 27" UR-6

## (undated) DEVICE — SOL IRR BAG NS 0.9% 3000ML

## (undated) DEVICE — PREP CHLORAPREP HI-LITE ORANGE 26ML

## (undated) DEVICE — TROCAR GELPOINT MINI ADVANCED

## (undated) DEVICE — TROCAR COVIDIEN VERSAPORT BLADELESS OPTICAL 12MM STANDARD

## (undated) DEVICE — NDL HYPO SAFE 22G X 1.5" (BLACK)

## (undated) DEVICE — WARMING BLANKET UPPER ADULT

## (undated) DEVICE — GOWN TRIMAX XXL

## (undated) DEVICE — APPLICATOR SURGICEL LAP TROCAR POINT 2.5MM X 150MM

## (undated) DEVICE — DRSG TAPE UMBILICAL COTTON 2" X 30 X 1/8"

## (undated) DEVICE — SUT MONOCRYL 4-0 27" PS-2 UNDYED

## (undated) DEVICE — GLV 8 PROTEXIS (WHITE)

## (undated) DEVICE — PREP CHLOROHEXIDINE 4% 118CC KIT

## (undated) DEVICE — VENODYNE/SCD SLEEVE CALF MEDIUM

## (undated) DEVICE — PACK GYN WDC

## (undated) DEVICE — SEALER TISS ENSEAL CRVD X1 35CM

## (undated) DEVICE — TIP METZENBAUM SCISSOR MONOPOLAR ENDOCUT (ORANGE)

## (undated) DEVICE — Device

## (undated) DEVICE — SHEARS HARMONIC HD 1000I 5MM X 36CM CURVED TIP

## (undated) DEVICE — TUBING STRYKER PNEUMOCLEAR SMOKE EVACUATION HIGH FLOW

## (undated) DEVICE — POSITIONER PINK PAD PIGAZZI SYSTEM XL W ARM PROTECTOR

## (undated) DEVICE — D HELP - CLEARVIEW CLEARIFY SYSTEM

## (undated) DEVICE — SUT STRATAFIX SPIRAL PDS PLUS 1 35X35CM CTX VIOLET

## (undated) DEVICE — STAPLER SKIN INSORB

## (undated) DEVICE — DRSG DERMABOND 0.7ML

## (undated) DEVICE — ENDOCATCH 10MM SPECIMEN POUCH

## (undated) DEVICE — ENDOCATCH II 15MM